# Patient Record
Sex: FEMALE | Race: WHITE
[De-identification: names, ages, dates, MRNs, and addresses within clinical notes are randomized per-mention and may not be internally consistent; named-entity substitution may affect disease eponyms.]

---

## 2021-04-21 ENCOUNTER — HOSPITAL ENCOUNTER (INPATIENT)
Dept: HOSPITAL 26 - MED | Age: 56
LOS: 2 days | Discharge: HOME | DRG: 203 | End: 2021-04-23
Attending: FAMILY MEDICINE | Admitting: FAMILY MEDICINE
Payer: MEDICAID

## 2021-04-21 VITALS — DIASTOLIC BLOOD PRESSURE: 70 MMHG | SYSTOLIC BLOOD PRESSURE: 124 MMHG

## 2021-04-21 VITALS — HEIGHT: 66 IN | BODY MASS INDEX: 34.55 KG/M2 | WEIGHT: 215 LBS

## 2021-04-21 VITALS — SYSTOLIC BLOOD PRESSURE: 160 MMHG | DIASTOLIC BLOOD PRESSURE: 54 MMHG

## 2021-04-21 DIAGNOSIS — N39.0: ICD-10-CM

## 2021-04-21 DIAGNOSIS — E03.9: ICD-10-CM

## 2021-04-21 DIAGNOSIS — Z88.0: ICD-10-CM

## 2021-04-21 DIAGNOSIS — E66.9: ICD-10-CM

## 2021-04-21 DIAGNOSIS — E78.5: ICD-10-CM

## 2021-04-21 DIAGNOSIS — E11.319: ICD-10-CM

## 2021-04-21 DIAGNOSIS — R74.8: ICD-10-CM

## 2021-04-21 DIAGNOSIS — I20.9: ICD-10-CM

## 2021-04-21 DIAGNOSIS — K76.0: ICD-10-CM

## 2021-04-21 DIAGNOSIS — Z20.822: ICD-10-CM

## 2021-04-21 DIAGNOSIS — M32.9: ICD-10-CM

## 2021-04-21 DIAGNOSIS — M94.0: Primary | ICD-10-CM

## 2021-04-21 DIAGNOSIS — E86.0: ICD-10-CM

## 2021-04-21 LAB
ALBUMIN FLD-MCNC: 4.2 G/DL (ref 3.4–5)
AMYLASE SERPL-CCNC: 164 U/L (ref 25–115)
ANION GAP SERPL CALCULATED.3IONS-SCNC: 10.3 MMOL/L (ref 8–16)
APPEARANCE UR: CLEAR
AST SERPL-CCNC: 21 U/L (ref 15–37)
BASOPHILS # BLD AUTO: 0 K/UL (ref 0–0.22)
BASOPHILS NFR BLD AUTO: 0.8 % (ref 0–2)
BILIRUB SERPL-MCNC: 0.5 MG/DL (ref 0–1)
BILIRUB UR QL STRIP: (no result)
BUN SERPL-MCNC: 22 MG/DL (ref 7–18)
CHLORIDE SERPL-SCNC: 102 MMOL/L (ref 98–107)
CHOLEST/HDLC SERPL: 3.4 {RATIO} (ref 1–4.5)
CO2 SERPL-SCNC: 30.1 MMOL/L (ref 21–32)
COLOR UR: YELLOW
CREAT SERPL-MCNC: 1.1 MG/DL (ref 0.6–1.3)
EOSINOPHIL # BLD AUTO: 0.1 K/UL (ref 0–0.4)
EOSINOPHIL NFR BLD AUTO: 2.1 % (ref 0–4)
ERYTHROCYTE [DISTWIDTH] IN BLOOD BY AUTOMATED COUNT: 13.2 % (ref 11.6–13.7)
GFR SERPL CREATININE-BSD FRML MDRD: 66 ML/MIN (ref 90–?)
GLUCOSE SERPL-MCNC: 137 MG/DL (ref 74–106)
GLUCOSE UR STRIP-MCNC: NEGATIVE MG/DL
HCT VFR BLD AUTO: 42.2 % (ref 36–48)
HDLC SERPL-MCNC: 46 MG/DL (ref 40–60)
HGB BLD-MCNC: 14.2 G/DL (ref 12–16)
HGB UR QL STRIP: NEGATIVE
LDLC SERPL CALC-MCNC: 87 MG/DL (ref 60–100)
LEUKOCYTE ESTERASE UR QL STRIP: (no result)
LYMPHOCYTES # BLD AUTO: 2.1 K/UL (ref 2.5–16.5)
LYMPHOCYTES NFR BLD AUTO: 35.8 % (ref 20.5–51.1)
MAGNESIUM SERPL-MCNC: 1.8 MG/DL (ref 1.8–2.4)
MCH RBC QN AUTO: 31 PG (ref 27–31)
MCHC RBC AUTO-ENTMCNC: 34 G/DL (ref 33–37)
MCV RBC AUTO: 92.1 FL (ref 80–94)
MONOCYTES # BLD AUTO: 0.4 K/UL (ref 0.8–1)
MONOCYTES NFR BLD AUTO: 7.4 % (ref 1.7–9.3)
NEUTROPHILS # BLD AUTO: 3.1 K/UL (ref 1.8–7.7)
NEUTROPHILS NFR BLD AUTO: 53.9 % (ref 42.2–75.2)
NITRITE UR QL STRIP: NEGATIVE
PH UR STRIP: 5 [PH] (ref 5–9)
PHOSPHATE SERPL-MCNC: 3.5 MG/DL (ref 2.5–4.9)
PLATELET # BLD AUTO: 193 K/UL (ref 140–450)
POTASSIUM SERPL-SCNC: 4.4 MMOL/L (ref 3.5–5.1)
PROTHROMBIN TIME: 10.4 SECS (ref 10.8–13.4)
RBC # BLD AUTO: 4.59 MIL/UL (ref 4.2–5.4)
RBC #/AREA URNS HPF: (no result) /HPF (ref 0–5)
SODIUM SERPL-SCNC: 138 MMOL/L (ref 136–145)
T4 FREE SERPL-MCNC: 1.29 NG/DL (ref 0.76–1.46)
TRIGL SERPL-MCNC: 117 MG/DL (ref 30–150)
TSH SERPL DL<=0.05 MIU/L-ACNC: 1.02 UIU/ML (ref 0.34–3.74)
WBC # BLD AUTO: 5.8 K/UL (ref 4.8–10.8)
WBC,URINE: (no result) /HPF (ref 0–5)

## 2021-04-21 RX ADMIN — INSULIN GLARGINE SCH UNITS: 100 INJECTION, SOLUTION SUBCUTANEOUS at 21:00

## 2021-04-21 RX ADMIN — SODIUM CHLORIDE SCH MLS/HR: 9 INJECTION, SOLUTION INTRAVENOUS at 17:22

## 2021-04-21 RX ADMIN — Medication SCH MG: at 21:10

## 2021-04-21 NOTE — NUR
56 Y/O FEMALE C/O CHEST PAIN 7/10 DESCRIBES AS PRESSURE NON-RADIATING X5DAYS. 
PT STATES SHE IS FOLLOWING UP FROM [facility] TO REQUEST TO CONSIDER CAROTID 
IMAGING AND CARDIAC WORK UP DUE TO ABNORMAL ECG. PT DENIES N/V, DENIES 
FEVER/CHILLS.



PMH:DM, HLD, FATTY LIVER, LUPUS



ALLERGIES: PCN

## 2021-04-21 NOTE — NUR
RECEIVED REPORT FROM ER RN FOR CONTINUITY OF CARE. WITH CC OF CHEST PAIN AND ADMITTING DX OF 
CHEST PAIN, DM, AND LUPUS. AOX4, ABLE TO MAKE NEEDS KNOWN. NO C/O PAIN AT THIS TIME, NO SOB, 
RESPIRATIONS ARE EVEN AND UNLABORED. IV TO LEFT FA 20 G RUNNING NS AT 60CC/HR, PATENT AND 
DRESSING INTACT. ON ROOM AIR, SR ON MONITOR. SKIN INTACT. BOWEL AND BLADDER CONTINENT.  
SAFETY MEASURES IN PLACE, WILL CONTINUE TO MONITOR.

## 2021-04-21 NOTE — NUR
RECEIVED BEDSIDE REPORT FROM DAY RN. PT IS AAOX4. Setswana SPEAKING AMBULATORY AND ABLE TO 
MAKE NEEDS KNOWN. PT DENIES CHEST PAIN AT THIS TIME. SKIN IS INTACT. IV NOTED ON LAC 20G 
INFUSING NS PER ORDERS. PT IS ON CARDIAC DIET. TROP TO BE TRENDED. FIRST TROP NEGATIVE. POC 
DISCUSSED WITH PT. ALL NEEDS MET. CALL LIGHT IS WITHIN REACH.

## 2021-04-21 NOTE — NUR
Patient will be admitted to care of JENNIFER Barrios. Admited to telemetry.  
Will go to room 126B. Belongings list completed.  Report to PAL Chirinos .

## 2021-04-21 NOTE — NUR
BLOOD SUGAR 135 PT REFUSED YARELI LANTUS PER PT SHE TAKES IN THE MORNING AND TOOK IT THIS AM. 
ADMIN LOPRESSOR FOR /70 HR 68. AND METFORMIN. MED EDUCATION GIVEN. SNACK AT BEDSIDE. 
EDUCATED PT ON NEED FOR UA. PER PT SHE JUST CAME BACK FROM BATHROOM. ALL NEEDS MET. CALL 
LIGHT IS WITHIN REACH.

## 2021-04-22 VITALS — SYSTOLIC BLOOD PRESSURE: 122 MMHG | DIASTOLIC BLOOD PRESSURE: 72 MMHG

## 2021-04-22 VITALS — DIASTOLIC BLOOD PRESSURE: 56 MMHG | SYSTOLIC BLOOD PRESSURE: 124 MMHG

## 2021-04-22 VITALS — SYSTOLIC BLOOD PRESSURE: 145 MMHG | DIASTOLIC BLOOD PRESSURE: 75 MMHG

## 2021-04-22 VITALS — SYSTOLIC BLOOD PRESSURE: 136 MMHG | DIASTOLIC BLOOD PRESSURE: 71 MMHG

## 2021-04-22 VITALS — DIASTOLIC BLOOD PRESSURE: 76 MMHG | SYSTOLIC BLOOD PRESSURE: 136 MMHG

## 2021-04-22 VITALS — SYSTOLIC BLOOD PRESSURE: 156 MMHG | DIASTOLIC BLOOD PRESSURE: 72 MMHG

## 2021-04-22 LAB
ANION GAP SERPL CALCULATED.3IONS-SCNC: 14 MMOL/L (ref 8–16)
BARBITURATES UR QL SCN: NEGATIVE NG/ML
BASOPHILS # BLD AUTO: 0.1 K/UL (ref 0–0.22)
BASOPHILS NFR BLD AUTO: 1.5 % (ref 0–2)
BENZODIAZ UR QL SCN: NEGATIVE NG/ML
BUN SERPL-MCNC: 32 MG/DL (ref 7–18)
BZE UR QL SCN: NEGATIVE NG/ML
CANNABINOIDS UR QL SCN: NEGATIVE NG/ML
CHLORIDE SERPL-SCNC: 102 MMOL/L (ref 98–107)
CO2 SERPL-SCNC: 28.2 MMOL/L (ref 21–32)
CREAT SERPL-MCNC: 1.2 MG/DL (ref 0.6–1.3)
EOSINOPHIL # BLD AUTO: 0.1 K/UL (ref 0–0.4)
EOSINOPHIL NFR BLD AUTO: 1.7 % (ref 0–4)
ERYTHROCYTE [DISTWIDTH] IN BLOOD BY AUTOMATED COUNT: 13.3 % (ref 11.6–13.7)
GFR SERPL CREATININE-BSD FRML MDRD: 60 ML/MIN (ref 90–?)
GLUCOSE SERPL-MCNC: 97 MG/DL (ref 74–106)
HCT VFR BLD AUTO: 43.6 % (ref 36–48)
HGB BLD-MCNC: 14.6 G/DL (ref 12–16)
LYMPHOCYTES # BLD AUTO: 3.1 K/UL (ref 2.5–16.5)
LYMPHOCYTES NFR BLD AUTO: 43.1 % (ref 20.5–51.1)
MCH RBC QN AUTO: 31 PG (ref 27–31)
MCHC RBC AUTO-ENTMCNC: 33 G/DL (ref 33–37)
MCV RBC AUTO: 92.9 FL (ref 80–94)
MONOCYTES # BLD AUTO: 0.6 K/UL (ref 0.8–1)
MONOCYTES NFR BLD AUTO: 7.6 % (ref 1.7–9.3)
NEUTROPHILS # BLD AUTO: 3.4 K/UL (ref 1.8–7.7)
NEUTROPHILS NFR BLD AUTO: 46.1 % (ref 42.2–75.2)
OPIATES UR QL SCN: NEGATIVE NG/ML
PCP UR QL SCN: NEGATIVE NG/ML
PLATELET # BLD AUTO: 191 K/UL (ref 140–450)
POTASSIUM SERPL-SCNC: 4.2 MMOL/L (ref 3.5–5.1)
RBC # BLD AUTO: 4.69 MIL/UL (ref 4.2–5.4)
SODIUM SERPL-SCNC: 140 MMOL/L (ref 136–145)
T3RU NFR SERPL: 33 % (ref 24–39)
T4 SERPL-MCNC: 10.4 UG/DL (ref 4.5–12)
WBC # BLD AUTO: 7.3 K/UL (ref 4.8–10.8)

## 2021-04-22 RX ADMIN — LEVOFLOXACIN SCH MLS/HR: 5 INJECTION, SOLUTION INTRAVENOUS at 11:30

## 2021-04-22 RX ADMIN — INSULIN GLARGINE SCH UNITS: 100 INJECTION, SOLUTION SUBCUTANEOUS at 20:08

## 2021-04-22 RX ADMIN — PANTOPRAZOLE SODIUM SCH MG: 40 TABLET, DELAYED RELEASE ORAL at 08:10

## 2021-04-22 RX ADMIN — Medication SCH MG: at 20:08

## 2021-04-22 RX ADMIN — ATORVASTATIN CALCIUM SCH MG: 20 TABLET, FILM COATED ORAL at 08:08

## 2021-04-22 RX ADMIN — LEVOTHYROXINE SODIUM SCH MG: 100 TABLET ORAL at 06:14

## 2021-04-22 RX ADMIN — SODIUM CHLORIDE SCH MLS/HR: 9 INJECTION, SOLUTION INTRAVENOUS at 10:01

## 2021-04-22 RX ADMIN — Medication SCH MG: at 08:10

## 2021-04-22 NOTE — NUR
VITAL SIGNS ARE WITHIN NORMAL LIMITS. YARELI MEDICATIONS GIVEN AS PER ORDERS. MED EDUCATION 
GIVEN PT VERBALIZED UNDERSTANDING. ALL NEEDS MET. CALL LIGHT IS WITHIN REACH.

## 2021-04-22 NOTE — NUR
VITAL SIGNS ARE WITHIN NORMAL LIMITS. PT DENIES ANY DISTRESS. PT LAYING IN BED WATCHING TV. 
CALL LIGHT IS WITHIN REACH.

## 2021-04-22 NOTE — NUR
RECEIVED A TELEPHONE ORDER FROM DR. SAVAGE FOR A LEVOFLOXACIN 750MG IV DAILY, READ BACK AND 
ACKNOWLEDGED AND WILL BE CARRIED OUT.

## 2021-04-22 NOTE — NUR
SOCIAL WORK NOTE:



Patient's Orientation 

Unable To Assess

Information Provided By 

VANESSA HERNANDEZ - SISTER

Comments 

SW WAS UNABLE TO MEET PATIENT AT BEDSIDE. SW COMPLETED ASSESSMENT WITH 

PATIENT'S SISTER USING  CARMEL Rosen. 

, Realtionship and Phone Number 

VANESSA HERNANDEZ

SISTER

[phone]

Healthcare Power of  

No

Does Patient Have a POLST 

No

Identifying Problems 

No Social Work Triggers

Is A Social Work Consult Needed 

No

Mandate Report Filed 

No

Explanation Of Identifying Problems 

PATIENT IS A 55-YEAR-OLD FEMALE ADMITTED FOR CHEST PAIN, DIABETES, AND 

LUPUS. PATIENT HAS PMHX OF DIABETES. PER SISTER, PATIENT HAS NO HX OF 

MENTAL HEALTH OR SUBSTANCE ABUSE.

Admitted From 

Home

Pre-Admission Level Of Functioning Status 

Independent/Ambulatory

Prior Resources/Services Used In Last 12 Months 

No Prior Resources Used

Prior DME 

No Prior DME Used

Dialysis Comments 

N/A

Living Situation 

Lives With Family

House

Patient Had Caregiver 

No

Home Support 

No Caregiver Issues

Financial Issues 

No Known Financial Issue

Referral To The Financial Counselor Needed 

No

Factors/Needs 

No D/C Needs Identified

Pt/Rep Participated In Discharge Plan 

No

Patient/Family Agress With Discharge Plan 

Yes

Discharge Plan Comments 

TENTATIVE IDSCHARGE PLAN IS FOR PATIENT TO RETURN HOME.

DC Plan Status 

Initiated

## 2021-04-22 NOTE — NUR
RECEIVED BEDSIDE REPORT FROM DAY RN. PT IS AAOX4. Upper sorbian SPEAKING AMBULATORY AND ABLE TO 
MAKE NEEDS KNOWN. PT DENIES CHEST PAIN AT THIS TIME. SKIN IS INTACT. IV NOTED ON LAC 20G 
INFUSING NS PER ORDERS. PT IS ON CARDIAC DIET. TROP TO BE TRENDED. FIRST TROP NEGATIVE. POC 
DISCUSSED WITH PT. ALL NEEDS MET. CALL LIGHT IS WITHIN REACH.

## 2021-04-22 NOTE — NUR
PATIENT HAS BEEN SCREENED AND CATEGORIZED AS LOW NUTRITION RISK. PATIENT WILL BE SEEN WITHIN 
7 DAYS OF ADMISSION.



04/28/21



KAILASH WALKER RD

## 2021-04-22 NOTE — NUR
MEDICATIONS ADMINISTERED PER ORDER, PT TOLERATED WELL. ABLE TO SIT UP IN BED AND SWALLOW 
PILLOWS. ALERT AND ORIENTED, FOLLOWS COMMANDS. 157/75, HR 53, BP MEDS GIVEN. PT REMINDED TO 
USE CALL LIGHT IF NEEDING ASSISTANCE, VERBALIZED UNDERSTANDING.

-------------------------------------------------------------------------------

Addendum: 04/22/21 at 0945 by Yessenia Holm RN RN

-------------------------------------------------------------------------------

HR 63

## 2021-04-22 NOTE — NUR
RECEIVED HANDOFF FROM NIGHT SHIFT FOR CONTINUITY OF CARE. PT IS ALERT AND ORIENTED X4. PT IS 
ON ROOM AIR, RESPIRATIONS EVEN AND UNLABORED, NO SIGNS OF RESPIRATORY DISTRESS. PT IS 
CURRENTLY SR ON THE MONITOR AT THIS TIME. PT IS ON CARDIAC DIET CURRENTLY. FOR ACCESS, LAC 
20 G IS PRESENT WITH NS RUNNING AT 60 ML/HR. HOB IS 30 DEG, WITH BED IN LOW, LOCKED 
POSITION. CALL LIGHT IS WITHIN REACH AND SAFETY MEASURES IN PLACE.

## 2021-04-22 NOTE — NUR
DC PLANNIN YRS OLD FEMALE PATIENT WAS ADMITTED FROM HOME WITH A DX OF CHEST PAIN, DIABETES AND 
LUPUS. PT HAS A HX OF LUPUS, HLD ,FATTY LIVER, DM, AND HYPOTHYROIDISM. CXR SHOWED NO 
SIGNIFICANT CARDIOPULMONARY DISEASE. RAPID COVID TEST NEGATIVE. BLOOD AND URINE CULTURE 
PENDING. ADMINISTERED IVF, IV ABX ROCEPHIN AND CONTINUED HOME MEDS.CONSULTED WITH 
CARDIOLOGIST. DC PLAN TO GO HOME WHEN STABLE. CM TO FOLLOW

## 2021-04-22 NOTE — NUR
MEDICATION ADMINISTERED PER ORDER, PT TOLERATED WELL. PT ABLE TO GET UP AND AMBULATE TO 
RESTROOM ON OWN, GAIT STEADY. VSS AT THIS TIME.

## 2021-04-22 NOTE — NUR
ROUNDS MADE. PT RESTING COMFORTABLY IN BED WATCHING TV. NO S/S OF DISTRESS. ALL SAFETY 
MEASURES ARE IN PLACE. WILL CONTINUE TO MONITOR.

## 2021-04-23 VITALS — DIASTOLIC BLOOD PRESSURE: 77 MMHG | SYSTOLIC BLOOD PRESSURE: 122 MMHG

## 2021-04-23 VITALS — SYSTOLIC BLOOD PRESSURE: 134 MMHG | DIASTOLIC BLOOD PRESSURE: 59 MMHG

## 2021-04-23 VITALS — DIASTOLIC BLOOD PRESSURE: 62 MMHG | SYSTOLIC BLOOD PRESSURE: 139 MMHG

## 2021-04-23 VITALS — SYSTOLIC BLOOD PRESSURE: 135 MMHG | DIASTOLIC BLOOD PRESSURE: 73 MMHG

## 2021-04-23 LAB
ANION GAP SERPL CALCULATED.3IONS-SCNC: 9.2 MMOL/L (ref 8–16)
BASOPHILS # BLD AUTO: 0.1 K/UL (ref 0–0.22)
BASOPHILS NFR BLD AUTO: 0.8 % (ref 0–2)
BUN SERPL-MCNC: 18 MG/DL (ref 7–18)
CHLORIDE SERPL-SCNC: 105 MMOL/L (ref 98–107)
CO2 SERPL-SCNC: 28 MMOL/L (ref 21–32)
CREAT SERPL-MCNC: 1 MG/DL (ref 0.6–1.3)
EOSINOPHIL # BLD AUTO: 0.2 K/UL (ref 0–0.4)
EOSINOPHIL NFR BLD AUTO: 3.3 % (ref 0–4)
ERYTHROCYTE [DISTWIDTH] IN BLOOD BY AUTOMATED COUNT: 13.2 % (ref 11.6–13.7)
GFR SERPL CREATININE-BSD FRML MDRD: 74 ML/MIN (ref 90–?)
GLUCOSE SERPL-MCNC: 124 MG/DL (ref 74–106)
HCT VFR BLD AUTO: 38.6 % (ref 36–48)
HGB BLD-MCNC: 13.2 G/DL (ref 12–16)
LYMPHOCYTES # BLD AUTO: 2.4 K/UL (ref 2.5–16.5)
LYMPHOCYTES NFR BLD AUTO: 34.1 % (ref 20.5–51.1)
MCH RBC QN AUTO: 31 PG (ref 27–31)
MCHC RBC AUTO-ENTMCNC: 34 G/DL (ref 33–37)
MCV RBC AUTO: 90.8 FL (ref 80–94)
MONOCYTES # BLD AUTO: 0.4 K/UL (ref 0.8–1)
MONOCYTES NFR BLD AUTO: 5.8 % (ref 1.7–9.3)
NEUTROPHILS # BLD AUTO: 3.9 K/UL (ref 1.8–7.7)
NEUTROPHILS NFR BLD AUTO: 56 % (ref 42.2–75.2)
PLATELET # BLD AUTO: 184 K/UL (ref 140–450)
POTASSIUM SERPL-SCNC: 4.2 MMOL/L (ref 3.5–5.1)
RBC # BLD AUTO: 4.25 MIL/UL (ref 4.2–5.4)
SODIUM SERPL-SCNC: 138 MMOL/L (ref 136–145)
WBC # BLD AUTO: 7 K/UL (ref 4.8–10.8)

## 2021-04-23 RX ADMIN — SODIUM CHLORIDE SCH MLS/HR: 9 INJECTION, SOLUTION INTRAVENOUS at 02:12

## 2021-04-23 RX ADMIN — Medication SCH MG: at 08:35

## 2021-04-23 RX ADMIN — LEVOTHYROXINE SODIUM SCH MG: 100 TABLET ORAL at 06:17

## 2021-04-23 RX ADMIN — PANTOPRAZOLE SODIUM SCH MG: 40 TABLET, DELAYED RELEASE ORAL at 08:35

## 2021-04-23 RX ADMIN — LEVOFLOXACIN SCH MLS/HR: 5 INJECTION, SOLUTION INTRAVENOUS at 12:23

## 2021-04-23 RX ADMIN — ATORVASTATIN CALCIUM SCH MG: 20 TABLET, FILM COATED ORAL at 08:34

## 2021-04-23 NOTE — NUR
ROUNDS MADE. PT OBSERVED LAYING IN BED APPEARS TO BE ASLEEP. CHEST RISE AND FALL NOTED. CALL 
LIGHT IS WITHIN REACH.

## 2021-04-23 NOTE — NUR
ADMINISTERED PRESCRIBED MEDS PER MD ORDER. PATIENT TOLERATED WELL. MEDICATION EDUCATION 
REINFORCEMENT NEEDED DUE TO LANGUAGE BARRIER. PATIENT SITTING UPRIGHT IN BED W/ BREAKFAST 
TRAY IN FRONT OF HER. TELEVISION IS ON. PATIENT SHOWS NO SIGNS OF DISTRESS/DISCOMFORT. 
DENIES PAIN. VISIBLE SKIN CHECK, INTACT FREE OF BRUISES/BUMPS/ABRASIONS. SAFETY MEASURES IN 
PLACE. WILL CONTINUE TO MONITOR.

## 2021-04-23 NOTE — NUR
ADMINISTERED PRESCRIBED MEDS PER MD ORDER. PATIENT TOLERATED WELL. MEDICATION EDUCATION 
PROVIDED, REINFORCEMENT NEEDED DUE TO LANGUAGE BARRIER. PATIENT TO BE DISCHARGED TODAY. 
STATED SISTER WILL PICK HER UP AT 1330. SAFETY MEASURES IN PLACE. WILL CONTINUE TO MONITOR.

## 2021-09-01 ENCOUNTER — HOSPITAL ENCOUNTER (EMERGENCY)
Dept: HOSPITAL 26 - MED | Age: 56
Discharge: HOME | End: 2021-09-01
Payer: MEDICAID

## 2021-09-01 VITALS — WEIGHT: 223 LBS | HEIGHT: 65 IN | BODY MASS INDEX: 37.15 KG/M2

## 2021-09-01 VITALS — DIASTOLIC BLOOD PRESSURE: 64 MMHG | SYSTOLIC BLOOD PRESSURE: 154 MMHG

## 2021-09-01 VITALS — SYSTOLIC BLOOD PRESSURE: 155 MMHG | DIASTOLIC BLOOD PRESSURE: 79 MMHG

## 2021-09-01 DIAGNOSIS — Z88.0: ICD-10-CM

## 2021-09-01 DIAGNOSIS — Z79.82: ICD-10-CM

## 2021-09-01 DIAGNOSIS — Z79.84: ICD-10-CM

## 2021-09-01 DIAGNOSIS — I10: ICD-10-CM

## 2021-09-01 DIAGNOSIS — M54.5: Primary | ICD-10-CM

## 2021-09-01 DIAGNOSIS — E03.9: ICD-10-CM

## 2021-09-01 DIAGNOSIS — M79.89: ICD-10-CM

## 2021-09-01 DIAGNOSIS — E11.9: ICD-10-CM

## 2021-09-01 DIAGNOSIS — Z79.899: ICD-10-CM

## 2021-09-01 LAB
APPEARANCE UR: CLEAR
BILIRUB UR QL STRIP: NEGATIVE
COLOR UR: YELLOW
GLUCOSE UR STRIP-MCNC: NEGATIVE MG/DL
HGB UR QL STRIP: NEGATIVE
LEUKOCYTE ESTERASE UR QL STRIP: NEGATIVE
NITRITE UR QL STRIP: NEGATIVE
PH UR STRIP: 5.5 [PH] (ref 5–9)

## 2021-09-01 PROCEDURE — 93971 EXTREMITY STUDY: CPT

## 2021-09-01 PROCEDURE — 99284 EMERGENCY DEPT VISIT MOD MDM: CPT

## 2021-09-01 PROCEDURE — 96372 THER/PROPH/DIAG INJ SC/IM: CPT

## 2021-09-01 PROCEDURE — 81003 URINALYSIS AUTO W/O SCOPE: CPT

## 2021-09-01 NOTE — NUR
56 Y/O F BIB SELF FROM HOME, C/O NAUSEA, LOW BACK PAIN THAT RADIATES TO PELVIS 
FOR 6 DAYS. PT STATES SHE TOOK INSULIN THIS MORNING, BUT NOT PO MEDS DUE TO 
LACK OF APPETITE. NO HTN MEDS THIS MORNING. DENIES HEMATURIA, VOMITING, 
DIARRHEA, CONSTIPATION, AND DYSURIA. DENIES VOMITING AND DIARRHEA; SKIN IS 
PINK/WARM/DRY; AAOX4 WITH EVEN AND STEADY GAIT; LUNGS CLEAR BL; HR EVEN AND 
REGULAR; PT DENIES ANY FEVER, CP, SOB, OR COUGH AT THIS TIME; PATIENT STATES 
PAIN OF 8/10 AT THIS TIME; PATIENT POSITIONED FOR COMFORT; HOB ELEVATED; 
BEDRAILS UP X2; BED DOWN. ER MD MADE AWARE OF PT STATUS.



PMH: DM2, HTN, HYPOTHYROID

ALLERGY: PENICILLIN

MED: UNABLE TO RECALL AT HOME MEDS

## 2022-07-19 NOTE — NUR
Pt ambulated to ER bed 9 with a steady gait. [de-identified] : 61 y/o female presents for CPE.\par Last seen >1 year ago.\par Has intermittent postnasal drip that has been acting up and plans to see her ENT soon ([facility]).\par No other complaints. \par Due for MAMMO, PAP, dentist, CRC screen.